# Patient Record
Sex: MALE | NOT HISPANIC OR LATINO | URBAN - METROPOLITAN AREA
[De-identification: names, ages, dates, MRNs, and addresses within clinical notes are randomized per-mention and may not be internally consistent; named-entity substitution may affect disease eponyms.]

---

## 2023-12-06 PROCEDURE — RXMED WILLOW AMBULATORY MEDICATION CHARGE

## 2023-12-07 PROCEDURE — RXMED WILLOW AMBULATORY MEDICATION CHARGE

## 2023-12-08 ENCOUNTER — PHARMACY VISIT (OUTPATIENT)
Dept: PHARMACY | Facility: CLINIC | Age: 53
End: 2023-12-08
Payer: COMMERCIAL

## 2024-01-15 ENCOUNTER — PHARMACY VISIT (OUTPATIENT)
Dept: PHARMACY | Facility: CLINIC | Age: 54
End: 2024-01-15
Payer: COMMERCIAL

## 2024-01-15 PROCEDURE — RXMED WILLOW AMBULATORY MEDICATION CHARGE

## 2024-01-15 RX ORDER — AZITHROMYCIN 250 MG/1
TABLET, FILM COATED ORAL
Qty: 6 TABLET | Refills: 0 | OUTPATIENT
Start: 2024-01-15

## 2024-01-15 RX ORDER — FLUTICASONE PROPIONATE 50 MCG
SPRAY, SUSPENSION (ML) NASAL
Qty: 16 G | Refills: 1 | OUTPATIENT
Start: 2024-01-15 | End: 2024-04-11

## 2024-03-07 PROCEDURE — RXMED WILLOW AMBULATORY MEDICATION CHARGE

## 2024-03-09 ENCOUNTER — PHARMACY VISIT (OUTPATIENT)
Dept: PHARMACY | Facility: CLINIC | Age: 54
End: 2024-03-09
Payer: COMMERCIAL

## 2024-06-18 DIAGNOSIS — R79.9 ABNORMAL FINDING OF BLOOD CHEMISTRY, UNSPECIFIED: ICD-10-CM

## 2024-06-18 DIAGNOSIS — D84.9 IMMUNODEFICIENCY, UNSPECIFIED (MULTI): ICD-10-CM

## 2024-06-18 DIAGNOSIS — Z48.298 ENCOUNTER FOR AFTERCARE FOLLOWING OTHER ORGAN TRANSPLANT: ICD-10-CM

## 2024-06-18 DIAGNOSIS — Z94.0 KIDNEY TRANSPLANT STATUS (HHS-HCC): Primary | ICD-10-CM

## 2024-06-18 DIAGNOSIS — Z79.899 OTHER LONG TERM (CURRENT) DRUG THERAPY: ICD-10-CM

## 2025-01-06 ENCOUNTER — PHARMACY VISIT (OUTPATIENT)
Dept: PHARMACY | Facility: CLINIC | Age: 55
End: 2025-01-06
Payer: COMMERCIAL

## 2025-01-06 PROCEDURE — RXMED WILLOW AMBULATORY MEDICATION CHARGE

## 2025-01-06 RX ORDER — ALBUTEROL SULFATE 90 UG/1
INHALANT RESPIRATORY (INHALATION)
Qty: 18 G | Refills: 0 | OUTPATIENT
Start: 2025-01-06

## 2025-01-06 RX ORDER — AZITHROMYCIN 250 MG/1
TABLET, FILM COATED ORAL
Qty: 6 TABLET | Refills: 0 | OUTPATIENT
Start: 2025-01-06

## 2025-02-13 ENCOUNTER — HOSPITAL ENCOUNTER (OUTPATIENT)
Dept: RADIOLOGY | Facility: CLINIC | Age: 55
Discharge: HOME | End: 2025-02-13
Payer: COMMERCIAL

## 2025-02-13 ENCOUNTER — OFFICE VISIT (OUTPATIENT)
Dept: URGENT CARE | Facility: CLINIC | Age: 55
End: 2025-02-13
Payer: COMMERCIAL

## 2025-02-13 ENCOUNTER — PHARMACY VISIT (OUTPATIENT)
Dept: PHARMACY | Facility: CLINIC | Age: 55
End: 2025-02-13
Payer: COMMERCIAL

## 2025-02-13 VITALS
WEIGHT: 250 LBS | BODY MASS INDEX: 35.79 KG/M2 | HEIGHT: 70 IN | DIASTOLIC BLOOD PRESSURE: 79 MMHG | SYSTOLIC BLOOD PRESSURE: 124 MMHG | OXYGEN SATURATION: 98 % | RESPIRATION RATE: 18 BRPM | HEART RATE: 54 BPM | TEMPERATURE: 98.6 F

## 2025-02-13 DIAGNOSIS — R05.1 ACUTE COUGH: ICD-10-CM

## 2025-02-13 DIAGNOSIS — R05.1 ACUTE COUGH: Primary | ICD-10-CM

## 2025-02-13 DIAGNOSIS — J01.90 ACUTE RHINOSINUSITIS: ICD-10-CM

## 2025-02-13 DIAGNOSIS — J98.01 BRONCHOSPASM: ICD-10-CM

## 2025-02-13 LAB
POC CORONAVIRUS 2019 BY PCR (COV19): NOT DETECTED
POC FLU A RESULT: NOT DETECTED
POC FLU B RESULT: NOT DETECTED
POC RSV PCR: NOT DETECTED

## 2025-02-13 PROCEDURE — RXMED WILLOW AMBULATORY MEDICATION CHARGE

## 2025-02-13 PROCEDURE — 99212 OFFICE O/P EST SF 10 MIN: CPT | Mod: 25 | Performed by: PHYSICIAN ASSISTANT

## 2025-02-13 PROCEDURE — 71046 X-RAY EXAM CHEST 2 VIEWS: CPT

## 2025-02-13 RX ORDER — FENOFIBRATE 160 MG/1
160 TABLET ORAL
COMMUNITY
Start: 2025-01-20

## 2025-02-13 RX ORDER — ALBUTEROL SULFATE 90 UG/1
2 INHALANT RESPIRATORY (INHALATION) 4 TIMES DAILY PRN
COMMUNITY
Start: 2025-01-20 | End: 2025-02-13 | Stop reason: ALTCHOICE

## 2025-02-13 RX ORDER — ALBUTEROL SULFATE 90 UG/1
2 INHALANT RESPIRATORY (INHALATION) EVERY 6 HOURS PRN
Qty: 6.7 G | Refills: 0 | Status: SHIPPED | OUTPATIENT
Start: 2025-02-13 | End: 2026-02-13

## 2025-02-13 RX ORDER — NAPROXEN SODIUM 220 MG/1
1 TABLET, FILM COATED ORAL DAILY
COMMUNITY

## 2025-02-13 RX ORDER — MYCOPHENOLATE MOFETIL 500 MG/1
500 TABLET ORAL 2 TIMES DAILY
COMMUNITY
Start: 2024-11-01

## 2025-02-13 RX ORDER — ROSUVASTATIN CALCIUM 5 MG/1
5 TABLET, COATED ORAL
COMMUNITY
Start: 2025-01-20

## 2025-02-13 RX ORDER — MONTELUKAST SODIUM 10 MG/1
1 TABLET ORAL DAILY
COMMUNITY

## 2025-02-13 RX ORDER — CETIRIZINE HYDROCHLORIDE 10 MG/1
10 TABLET ORAL
COMMUNITY

## 2025-02-13 RX ORDER — CALCITRIOL 0.25 UG/1
0.25 CAPSULE ORAL
COMMUNITY
Start: 2025-01-20

## 2025-02-13 RX ORDER — OMEGA-3-ACID ETHYL ESTERS 1 G/1
2 CAPSULE, LIQUID FILLED ORAL 2 TIMES DAILY
COMMUNITY
Start: 2025-01-20

## 2025-02-13 RX ORDER — DOXAZOSIN 8 MG/1
8 TABLET ORAL
COMMUNITY
Start: 2025-01-20

## 2025-02-13 RX ORDER — ALENDRONATE SODIUM TABLET 35 MG/1
35 TABLET ORAL WEEKLY
COMMUNITY
Start: 2025-01-20

## 2025-02-13 RX ORDER — AZITHROMYCIN 250 MG/1
TABLET, FILM COATED ORAL
Qty: 6 TABLET | Refills: 0 | Status: SHIPPED | OUTPATIENT
Start: 2025-02-13 | End: 2025-02-18

## 2025-02-13 RX ORDER — DESLORATADINE 5 MG/1
1 TABLET ORAL DAILY
COMMUNITY

## 2025-02-13 RX ORDER — IRBESARTAN 150 MG/1
1 TABLET ORAL DAILY
COMMUNITY

## 2025-02-13 RX ORDER — CYCLOSPORINE 25 MG/1
100 CAPSULE, LIQUID FILLED ORAL EVERY 12 HOURS
COMMUNITY
Start: 2024-06-17

## 2025-02-13 RX ORDER — FAMOTIDINE 20 MG/1
TABLET, FILM COATED ORAL 2 TIMES DAILY
COMMUNITY

## 2025-02-13 RX ORDER — ATENOLOL 100 MG/1
100 TABLET ORAL 2 TIMES DAILY
COMMUNITY
Start: 2025-01-20

## 2025-02-13 RX ORDER — CLONIDINE HYDROCHLORIDE 0.3 MG/1
TABLET ORAL 2 TIMES DAILY
COMMUNITY

## 2025-02-13 RX ORDER — AMLODIPINE BESYLATE 10 MG/1
10 TABLET ORAL EVERY 24 HOURS
COMMUNITY
Start: 2025-01-20

## 2025-02-13 NOTE — PROGRESS NOTES
[facility] URGENT CARE [place] NOTE:      Name: Kumar Oglesby, 54 y.o.    CSN:[id number]   MRN:[id number]    PCP: Michael Gordon MD    ALL:    Allergies   Allergen Reactions    Penicillins Rash and Unknown       History:    Chief Complaint: URI (Cough, chest congestion since this AM )    Encounter Date: 2/13/2025 @ 1030    HPI: The history was obtained from the patient. Kumar is a 54 y.o. male, non-smoker with a PMH of kidney transplant who presents with a chief complaint of URI (Cough, chest congestion since this AM ) Kumar reports a productive and persistent cough with brown-colored sputum since this morning. He also endorses chest tightness, nasal congestion and rhinorrhea. Pr reports concern of pneumonia since he has had it twice before and these sxs feel similar. There have been several people at his work with cold/flu sxs that he is exposed to. He has not tried any medications to alleviate his sxs. Pt denies fever, weight gain, edema, rashes, lesions, erythema, sinus congestion, aural fullness, sore throat, chest pain, abdominal pain, n/v/d.    PMHx:    Past Medical History:   Diagnosis Date    Dyslipidemia     Hypertension     Renal disorder     Renal transplant recipient ([facility])               Current Outpatient Medications   Medication Sig Dispense Refill    alendronate (Fosamax) 35 mg tablet Take 1 tablet (35 mg) by mouth once a week.      amLODIPine (Norvasc) 10 mg tablet Take 1 tablet (10 mg) by mouth once every 24 hours.      aspirin 81 mg chewable tablet Chew 1 tablet (81 mg) once daily.      atenolol (Tenormin) 100 mg tablet Take 1 tablet (100 mg) by mouth twice a day.      calcitriol (Rocaltrol) 0.25 mcg capsule Take 1 capsule (0.25 mcg) by mouth once daily.      cetirizine (ZyrTEC) 10 mg tablet Take 1 tablet (10 mg) by mouth once daily.      ciclopirox (Penlac) 8 % solution Apply to affected nails daily. Remove with nail polisher remover every 7th day. 6.6 mL 3     cloNIDine (Catapres) 0.3 mg tablet Take by mouth twice a day.      cycloSPORINE modified (NEOral) 25 mg capsule Take 4 capsules (100 mg) by mouth every 12 hours.      desloratadine (Clarinex) 5 mg tablet Take 1 tablet (5 mg) by mouth once daily.      doxazosin (Cardura) 8 mg tablet Take 1 tablet (8 mg) by mouth once daily.      famotidine (Pepcid) 20 mg tablet Take by mouth twice a day.      fenofibrate (Triglide) 160 mg tablet Take 1 tablet (160 mg) by mouth once daily.      ipratropium-albuteroL (Combivent Respimat)  mcg/actuation inhaler Inhale 1 puff every 6 hours if needed.      irbesartan (Avapro) 150 mg tablet Take 1 tablet (150 mg) by mouth once daily.      montelukast (Singulair) 10 mg tablet Take 1 tablet (10 mg) by mouth once daily.      mycophenolate (Cellcept) 500 mg tablet Take 1 tablet (500 mg) by mouth twice a day.      omega-3 acid ethyl esters (Lovaza) 1 gram capsule Take 2 capsules (2 g) by mouth twice a day.      rosuvastatin (Crestor) 5 mg tablet Take 1 tablet (5 mg) by mouth once daily.      terbinafine (LamISIL) 250 mg tablet Take 1 tablet by mouth daily for 21 days. Refill Rx in 3 months 21 tablet 1    albuterol 90 mcg/actuation inhaler Inhale 2 puffs every 6 hours if needed for wheezing. 6.7 g 0    azithromycin (Zithromax) 250 mg tablet Take 2 tablets (500 mg) on  Day 1,  followed by 1 tablet (250 mg) once daily on Days 2 through 5. 6 tablet 0    hydrALAZINE (Apresoline) 50 mg tablet TAKE ONE TABLET BY MOUTH 3 TIMES A  tablet 5     No current facility-administered medications for this visit.         PMSx:  No past surgical history on file.    Fam Hx: No family history on file.    SOC. Hx:     Social History     Socioeconomic History    Marital status: Single     Spouse name: Not on file    Number of children: Not on file    Years of education: Not on file    Highest education level: Not on file   Occupational History    Not on file   Tobacco Use    Smoking status: Not on file     Smokeless tobacco: Not on file   Substance and Sexual Activity    Alcohol use: Not on file    Drug use: Not on file    Sexual activity: Not on file   Other Topics Concern    Not on file   Social History Narrative    Not on file     Social Drivers of Health     Financial Resource Strain: Not on file   Food Insecurity: Not on file   Transportation Needs: Not on file   Physical Activity: Not on file   Stress: Not on file   Social Connections: Not on file   Intimate Partner Violence: Not on file   Housing Stability: Not on file         Vitals:    02/13/25 1029   BP: 124/79   Pulse: 54   Resp: 18   Temp: 37 °C (98.6 °F)   SpO2: 98%     113 kg (250 lb)          Physical Exam  Vitals reviewed.   Constitutional:       Appearance: Normal appearance.   HENT:      Head: Normocephalic and atraumatic.      Nose: Mucosal edema and congestion present.      Right Turbinates: Enlarged and swollen.      Left Turbinates: Enlarged and swollen.      Mouth/Throat:      Mouth: Mucous membranes are moist.      Pharynx: No oropharyngeal exudate.   Eyes:      Extraocular Movements: Extraocular movements intact.      Pupils: Pupils are equal, round, and reactive to light.   Cardiovascular:      Rate and Rhythm: Normal rate.      Pulses: Normal pulses.   Pulmonary:      Effort: Pulmonary effort is normal.      Breath sounds: Examination of the right-middle field reveals wheezing. Wheezing (most expiratory) present. No decreased breath sounds or rhonchi.   Musculoskeletal:         General: Normal range of motion.      Cervical back: Normal range of motion.   Skin:     General: Skin is warm.   Neurological:      Mental Status: He is alert and oriented to person, place, and time.           LABORATORY @ RADIOLOGICAL IMAGING (if done):     Results for orders placed or performed in visit on 02/13/25 (from the past 24 hours)   POCT SARS-COV-2/FLU/RSV PCR SYMPTOMATIC manually resulted   Result Value Ref Range    POC Coronavirus 2019, PCR Not Detected  Not Detected    POC Flu A Result Not Detected Not Detected    POC Flu B Result Not Detected Not Detected    POC RSV PCR Not Detected Not Detected     Interpreted By:  Rogelio Choi,   STUDY:  XR CHEST 2 VIEWS  2/13/2025 11:10 am      INDICATION:  Signs/Symptoms:cough, productive browinish sputum, right lower lobe  wheeze - expiratory      COMPARISON:  None.      ACCESSION NUMBER(S):  [id number]      ORDERING CLINICIAN:  ISREAL KIRKLAND      TECHNIQUE:  PA and lateral views of the chest were obtained.      FINDINGS:  No focal infiltrate, pleural effusion or pneumothorax is identified.  The cardiac silhouette and mediastinal is within normal limits for  size.  Mild discogenic degenerative changes are seen throughout the  thoracic spine.      IMPRESSION:  No focal infiltrate or pneumothorax.      MACRO:  None.      Signed by: Rogelio Choi 2/13/2025 12:17 PM  Dictation workstation:   [id number]  ____________________________________________________________________    I did personally review Kumar's past medical history, surgical history, social history, as well as family history (when relevant).  In this case, I also oversaw the his drug management by reviewing his medication list, allergy list, as well as the medications that I prescribed during the UC course and/or recommended as an out-patient (including possible OTC medications such as acetaminophen, NSAIDs , etc).    After reviewing the items above, I did look at previous medical documentation, such as recent hospitalizations, office visits, and/or recent consultations with PCP/specialist.                          SDOH:   Another factor that I considered in Kumar's care was his Social Determinants of Health (SDOH). During this UC encounter, he did not have social determinants of health. Those SDOH influencing Kumar's care are: none      UC COURSE/MEDICAL DECISION MAKING:    Kumar is a 54 y.o., who presents with a working diagnosis of   1. Acute cough    2.  Bronchospasm    3. Acute rhinosinusitis     with a differential to include:   Influenza, parainfluenza, rhinovirus, adenovirus, metapneumovirus, coronavirus, COVID-19, postnasal drip, strep pharyngitis, GERD, retropharyngeal abscess, tonsillitis, adenitis, seasonal allergies      Plan:   Patient has evidence of acute rhinosinusitis likely secondary to a virus, has a history of renal transplant, currently is taking antirejection medications, has notable bronchospasm on exam we will be treating with him with an inhaler and Zithromax, he was reassured, x-ray was performed given the adventitious lung sounds and coughing event plus to assess for possible pneumonia, this is not revealing any acute finding.    I, Elvira Jimenes, PA student, helped prepare the medical record for my supervising clinician, gallo escoto.   DEMETRICE Saba, [facility]    Supervised by  Gallo Escoto PA-C   Advanced Practice Provider  [facility] URGENT CARE    I was present with the PA student who participated in the documentation of this note. I have personally seen and re-examined the patient and performed the medical decision-making components (assessment and plan of care). I have reviewed the PA student documentation and verified the findings in the note as written with additions or exceptions as stated in the body of this note.    Gallo Escoto PA-C

## 2025-08-19 PROCEDURE — RXMED WILLOW AMBULATORY MEDICATION CHARGE

## 2025-08-21 ENCOUNTER — PHARMACY VISIT (OUTPATIENT)
Dept: PHARMACY | Facility: CLINIC | Age: 55
End: 2025-08-21
Payer: COMMERCIAL